# Patient Record
Sex: FEMALE | Race: WHITE | Employment: STUDENT | URBAN - METROPOLITAN AREA
[De-identification: names, ages, dates, MRNs, and addresses within clinical notes are randomized per-mention and may not be internally consistent; named-entity substitution may affect disease eponyms.]

---
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
## 2020-05-16 ENCOUNTER — APPOINTMENT (OUTPATIENT)
Dept: GENERAL RADIOLOGY | Facility: HOSPITAL | Age: 20
End: 2020-05-16
Payer: COMMERCIAL

## 2020-05-16 ENCOUNTER — HOSPITAL ENCOUNTER (EMERGENCY)
Facility: HOSPITAL | Age: 20
Discharge: HOME | End: 2020-05-17
Payer: COMMERCIAL

## 2020-05-16 DIAGNOSIS — S52.134A CLOSED NONDISPLACED FRACTURE OF NECK OF RIGHT RADIUS, INITIAL ENCOUNTER: Primary | ICD-10-CM

## 2020-05-16 PROCEDURE — 73080 X-RAY EXAM OF ELBOW: CPT

## 2020-05-16 PROCEDURE — 99284 EMERGENCY DEPT VISIT MOD MDM: CPT

## 2020-05-16 PROCEDURE — 29105 APPLICATION LONG ARM SPLINT: CPT

## 2020-05-17 VITALS
BODY MASS INDEX: 23.55 KG/M2 | TEMPERATURE: 99 F | HEIGHT: 62 IN | RESPIRATION RATE: 16 BRPM | WEIGHT: 128 LBS | OXYGEN SATURATION: 99 % | DIASTOLIC BLOOD PRESSURE: 70 MMHG | SYSTOLIC BLOOD PRESSURE: 119 MMHG | HEART RATE: 65 BPM

## 2020-05-17 RX ORDER — HYDROCODONE BITARTRATE AND ACETAMINOPHEN 5; 325 MG/1; MG/1
1 TABLET ORAL EVERY 6 HOURS PRN
Qty: 6 TABLET | Refills: 0 | Status: SHIPPED | OUTPATIENT
Start: 2020-05-17 | End: 2020-07-14 | Stop reason: ALTCHOICE

## 2020-05-17 RX ORDER — HYDROCODONE BITARTRATE AND ACETAMINOPHEN 5; 325 MG/1; MG/1
2 TABLET ORAL ONCE
Status: COMPLETED | OUTPATIENT
Start: 2020-05-17 | End: 2020-05-17

## 2020-05-17 NOTE — ED INITIAL ASSESSMENT (HPI)
Pt states that she was trying to go for a run 1 hour PTA and had mechanical fall resulting in R elbow pain. States that her arms were extended when she fell forward, denies pain to the L arm. CMS intact.

## 2020-05-17 NOTE — ED PROVIDER NOTES
Patient Seen in: [facility] AND [facility] Emergency Department    History   No chief complaint on file. HPI    Patient presents to the ED complaining of right elbow pain after falling while running an hour ago.   She states that she fell on outstretched arm during my interaction with the patient were taken. The patient was already wearing a droplet mask on my arrival to the room.  Personal protective equipment including droplet mask, eye protection, and gloves were worn throughout the duration of the exam.  Gloria Fan effusion      Case faxed at 12:33 AM CT. If there are any questions, please contact me at [phone] (extension [phone]).       Kalpana Echavarria M.D.    ED Medications Administered:   Medications   HYDROcodone-acetaminophen (NORCO) 5-325 MG per tab 2 tablet (2 t diagnosis)    Disposition:  Discharge    Follow-up:  David Cedeno., MD  [address], [address]  [facility]  [phone]    Schedule an appointment as soon as possible for a visit in 3 days        Medications Prescribed:  Discharge Medication Alexandra White

## 2020-07-14 PROBLEM — S52.134A CLOSED NONDISPLACED FRACTURE OF NECK OF RIGHT RADIUS, INITIAL ENCOUNTER: Status: ACTIVE | Noted: 2020-07-14

## 2021-05-17 ENCOUNTER — APPOINTMENT (OUTPATIENT)
Dept: GENERAL RADIOLOGY | Facility: HOSPITAL | Age: 21
End: 2021-05-17
Payer: COMMERCIAL

## 2021-05-17 ENCOUNTER — HOSPITAL ENCOUNTER (EMERGENCY)
Facility: HOSPITAL | Age: 21
Discharge: HOME | End: 2021-05-17
Payer: COMMERCIAL

## 2021-05-17 VITALS
TEMPERATURE: 98 F | WEIGHT: 128 LBS | HEART RATE: 80 BPM | RESPIRATION RATE: 19 BRPM | DIASTOLIC BLOOD PRESSURE: 77 MMHG | OXYGEN SATURATION: 98 % | SYSTOLIC BLOOD PRESSURE: 109 MMHG | BODY MASS INDEX: 23 KG/M2

## 2021-05-17 DIAGNOSIS — M79.671 RIGHT FOOT PAIN: Primary | ICD-10-CM

## 2021-05-17 PROCEDURE — 81025 URINE PREGNANCY TEST: CPT

## 2021-05-17 PROCEDURE — 73630 X-RAY EXAM OF FOOT: CPT

## 2021-05-17 PROCEDURE — 99283 EMERGENCY DEPT VISIT LOW MDM: CPT

## 2021-05-17 RX ORDER — IBUPROFEN 600 MG/1
600 TABLET ORAL ONCE
Status: COMPLETED | OUTPATIENT
Start: 2021-05-17 | End: 2021-05-17

## 2021-05-18 NOTE — ED PROVIDER NOTES
Patient Seen in: [facility] AND [facility] Emergency Department    History   Patient presents with:  Leg or Foot Injury    Stated Complaint: R Foot Injury    HPI    HPI: Diana Cabrera is a 24year old female who presents after an injury to the right foot that oc except as otherwise stated in HPI.     Physical Exam     ED Triage Vitals [05/17/21 1840]   /79   Pulse 94   Resp 18   Temp 98 °F (36.7 °C)   Temp src    SpO2 98 %   O2 Device        Current:/79   Pulse 94   Temp 98 °F (36.7 °C)   Resp 18   Wt 5 Charlene Byrd MD on 5/17/2021 at 8:00 PM     Finalized by (CST): Charlene Byrd MD on 5/17/2021 at 8:01 PM            MDM     Patient has been icing. Will provide Motrin. Discussed normal x-ray results. Will provide Ace wrap.   Patient is uncomfor

## 2022-12-20 ENCOUNTER — OFFICE VISIT (OUTPATIENT)
Dept: INTERNAL MEDICINE CLINIC | Facility: CLINIC | Age: 22
End: 2022-12-20
Payer: COMMERCIAL

## 2022-12-20 VITALS
BODY MASS INDEX: 23.52 KG/M2 | HEIGHT: 62 IN | WEIGHT: 127.81 LBS | SYSTOLIC BLOOD PRESSURE: 120 MMHG | DIASTOLIC BLOOD PRESSURE: 70 MMHG | OXYGEN SATURATION: 98 % | HEART RATE: 73 BPM

## 2022-12-20 DIAGNOSIS — R59.9 ENLARGED LYMPH NODE: Primary | ICD-10-CM

## 2022-12-20 PROCEDURE — 3078F DIAST BP <80 MM HG: CPT | Performed by: NURSE PRACTITIONER

## 2022-12-20 PROCEDURE — 3074F SYST BP LT 130 MM HG: CPT | Performed by: NURSE PRACTITIONER

## 2022-12-20 PROCEDURE — 99213 OFFICE O/P EST LOW 20 MIN: CPT | Performed by: NURSE PRACTITIONER

## 2022-12-20 PROCEDURE — 3008F BODY MASS INDEX DOCD: CPT | Performed by: NURSE PRACTITIONER

## 2022-12-20 RX ORDER — AMOXICILLIN AND CLAVULANATE POTASSIUM 875; 125 MG/1; MG/1
1 TABLET, FILM COATED ORAL 2 TIMES DAILY
Qty: 20 TABLET | Refills: 0 | Status: SHIPPED | OUTPATIENT
Start: 2022-12-20 | End: 2022-12-30

## 2022-12-20 RX ORDER — DEXMETHYLPHENIDATE HYDROCHLORIDE 10 MG/1
10 TABLET ORAL 2 TIMES DAILY
COMMUNITY

## 2022-12-20 RX ORDER — SERTRALINE HYDROCHLORIDE 100 MG/1
200 TABLET, FILM COATED ORAL DAILY
COMMUNITY

## 2022-12-20 RX ORDER — AZITHROMYCIN 250 MG/1
TABLET, FILM COATED ORAL
Qty: 6 TABLET | Refills: 0 | Status: SHIPPED | OUTPATIENT
Start: 2022-12-20 | End: 2022-12-20 | Stop reason: ALTCHOICE

## 2022-12-20 NOTE — ASSESSMENT & PLAN NOTE
Palpable mobile and large lymph node. She had the same symptoms in September with a sore throat. She was given antibiotics and it cleared. This is her second episode of the same enlarged lymph node. Plan  Patient instructed to follow-up this does not resolve. Amoxicillin clavulanate 875-125 mg p.o. twice daily x10 days. Apply warm compresses several times a day on lymph node.

## 2024-04-08 ENCOUNTER — OFFICE VISIT (OUTPATIENT)
Dept: INTERNAL MEDICINE CLINIC | Facility: CLINIC | Age: 24
End: 2024-04-08

## 2024-04-08 VITALS
HEIGHT: 62 IN | HEART RATE: 78 BPM | WEIGHT: 140.38 LBS | DIASTOLIC BLOOD PRESSURE: 80 MMHG | SYSTOLIC BLOOD PRESSURE: 118 MMHG | BODY MASS INDEX: 25.83 KG/M2

## 2024-04-08 DIAGNOSIS — J06.9 ACUTE URI: Primary | ICD-10-CM

## 2024-04-08 LAB
CONTROL LINE PRESENT WITH A CLEAR BACKGROUND (YES/NO): YES YES/NO
KIT LOT #: NORMAL NUMERIC
STREP GRP A CUL-SCR: NEGATIVE

## 2024-04-08 RX ORDER — DEXMETHYLPHENIDATE HYDROCHLORIDE 20 MG/1
20 CAPSULE, EXTENDED RELEASE ORAL DAILY
COMMUNITY

## 2024-04-08 NOTE — PATIENT INSTRUCTIONS
Your Strep test today was negative  Treat symptoms with Tylenol, pseudoephedrine and warm salt water gargles as needed  Call if not better

## 2024-04-08 NOTE — PROGRESS NOTES
Charlotte Borjas is a 23 year old female.   Chief Complaint   Patient presents with    Upper Respiratory Infection     HPI:   For the past 2 days, she has had symptoms of nasal congestion, slight right ear pain, sore throat and right sided neck pain.  No fever.  No purulent nasal drainage, sinus pressure or pain, cough or shortness of breath.  She thinks she may have an ear infection.  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    Medications reviewed, as listed below.  No medication allergies  Current Outpatient Medications   Medication Sig Dispense Refill    Dexmethylphenidate HCl ER 20 MG Oral Capsule SR 24 Hr Take 1 capsule (20 mg total) by mouth daily.      sertraline 100 MG Oral Tab Take 2 tablets (200 mg total) by mouth daily.       No Known Allergies   Past Medical History:   Diagnosis Date    Hyperopia of both eyes with astigmatism 2007    Subjective visual disturbance of both eyes 2007      Social History:  Social History     Socioeconomic History    Marital status: Single   Tobacco Use    Smoking status: Never    Smokeless tobacco: Never   Vaping Use    Vaping Use: Never used   Substance and Sexual Activity    Alcohol use: No        EXAM:   GENERAL: Pleasant female appearing well and breathing comfortably in no distress  /80 (BP Location: Right arm, Patient Position: Sitting, Cuff Size: adult)   Pulse 78   Ht 5' 2\" (1.575 m)   Wt 140 lb 6.4 oz (63.7 kg)   BMI 25.68 kg/m²   HEENT: Anicteric, conjunctiva pink, canals and TMs normal bilaterally, no maxillary or frontal sinus tenderness, oropharynx normal without erythema ulceration swelling or exudate  NECK: Supple without mass, tenderness or lymphadenopathy  LUNGS: Resonant to percussion and clear to auscultation without crackles or wheezes    Rapid Strep negative    ASSESSMENT AND PLAN:   1. Acute URI  - POC Rapid Strep [56698]    Likely viral.  Symptomatic treatment-Tylenol, pseudoephedrine, saline gargles as needed  Call if  not soon better.     The patient indicates understanding of these issues and agrees to the plan.    Mauricio Magallanes MD  4/8/2024  3:20 PM

## 2024-11-18 ENCOUNTER — HOSPITAL ENCOUNTER (EMERGENCY)
Facility: HOSPITAL | Age: 24
Discharge: HOME | End: 2024-11-18
Attending: EMERGENCY MEDICINE
Payer: COMMERCIAL

## 2024-11-18 VITALS
SYSTOLIC BLOOD PRESSURE: 123 MMHG | OXYGEN SATURATION: 97 % | HEART RATE: 73 BPM | TEMPERATURE: 98 F | RESPIRATION RATE: 18 BRPM | DIASTOLIC BLOOD PRESSURE: 72 MMHG

## 2024-11-18 DIAGNOSIS — S61.219A LACERATION OF FINGER OF LEFT HAND WITHOUT FOREIGN BODY WITHOUT DAMAGE TO NAIL, UNSPECIFIED FINGER, INITIAL ENCOUNTER: Primary | ICD-10-CM

## 2024-11-18 PROCEDURE — 99283 EMERGENCY DEPT VISIT LOW MDM: CPT

## 2024-11-18 PROCEDURE — 12001 RPR S/N/AX/GEN/TRNK 2.5CM/<: CPT

## 2024-11-18 PROCEDURE — 90471 IMMUNIZATION ADMIN: CPT

## 2024-11-18 RX ORDER — LIDOCAINE HYDROCHLORIDE 10 MG/ML
INJECTION, SOLUTION EPIDURAL; INFILTRATION; INTRACAUDAL; PERINEURAL
Status: COMPLETED
Start: 2024-11-18 | End: 2024-11-18

## 2024-11-18 RX ORDER — LIDOCAINE HYDROCHLORIDE 10 MG/ML
20 INJECTION, SOLUTION EPIDURAL; INFILTRATION; INTRACAUDAL; PERINEURAL ONCE
Status: COMPLETED | OUTPATIENT
Start: 2024-11-18 | End: 2024-11-18

## 2024-11-18 NOTE — ED PROVIDER NOTES
Patient Seen in: [facility] Emergency Department      History     Chief Complaint   Patient presents with    Laceration/Abrasion     Stated Complaint: finger lac    Subjective:   HPI    Pt is 25 yo right handed F who p/w left 4th finger laceration that occurred immediately pta at home. Pt was cutting open bagel with butter knife. No other injuries. Tetanus status unknown.     Objective:     Past Medical History:    Hyperopia of both eyes with astigmatism    Subjective visual disturbance of both eyes              Past Surgical History:   Procedure Laterality Date    Appendectomy                  Social History     Socioeconomic History    Marital status: Single   Tobacco Use    Smoking status: Never    Smokeless tobacco: Never   Vaping Use    Vaping status: Never Used   Substance and Sexual Activity    Alcohol use: No                  Physical Exam     ED Triage Vitals [11/18/24 0112]   /72   Pulse 73   Resp 18   Temp 97.6 °F (36.4 °C)   Temp src Temporal   SpO2 97 %   O2 Device None (Room air)       Current Vitals:   Vital Signs  BP: 123/72  Pulse: 73  Resp: 18  Temp: 97.6 °F (36.4 °C)  Temp src: Temporal    Oxygen Therapy  SpO2: 97 %  O2 Device: None (Room air)        Physical Exam  GENERAL: No acute distress, awake and alert  HEENT: EOMI, PERRL  RESP: no tachypnea or retractions  Extremities: FROM of all extremities  Skin: 2.5 cm semi-circular laceration to lateral distal left 4th finger. Nail/nailbed intact  Neuro: CN intact, normal speech, normal gait, 5/5 motor strength in all extremities, no focal deficits      ED Course   Labs Reviewed - No data to display       MDM      Medical Decision Making  Tetanus updated  Wound irrigated  Anesthetized with 1% lidocaine  Closed with #4 5-0 ethilon sutures. Sutures simple interrupted. Performed by me at bedside.         Disposition and Plan     Clinical Impression:  1. Laceration of finger of left hand without foreign body without damage to nail,  unspecified finger, initial encounter         Disposition:  Discharge  11/18/2024  1:54 am    Follow-up:  Lynn Cardenas MD  [address]  [facility] [address]  [facility] [address]  [phone]    Follow up in 1 week(s)  For suture removal          Medications Prescribed:  Current Discharge Medication List              Supplementary Documentation: